# Patient Record
Sex: FEMALE | Race: WHITE | NOT HISPANIC OR LATINO | URBAN - METROPOLITAN AREA
[De-identification: names, ages, dates, MRNs, and addresses within clinical notes are randomized per-mention and may not be internally consistent; named-entity substitution may affect disease eponyms.]

---

## 2017-01-30 ENCOUNTER — COMMUNICATION - HEALTHEAST (OUTPATIENT)
Dept: FAMILY MEDICINE | Facility: CLINIC | Age: 32
End: 2017-01-30

## 2017-01-30 DIAGNOSIS — Z71.84 TRAVEL ADVICE ENCOUNTER: ICD-10-CM

## 2017-04-21 ENCOUNTER — COMMUNICATION - HEALTHEAST (OUTPATIENT)
Dept: TELEHEALTH | Facility: CLINIC | Age: 32
End: 2017-04-21

## 2017-04-21 ENCOUNTER — RECORDS - HEALTHEAST (OUTPATIENT)
Dept: GENERAL RADIOLOGY | Facility: CLINIC | Age: 32
End: 2017-04-21

## 2017-04-21 ENCOUNTER — OFFICE VISIT - HEALTHEAST (OUTPATIENT)
Dept: FAMILY MEDICINE | Facility: CLINIC | Age: 32
End: 2017-04-21

## 2017-04-21 DIAGNOSIS — K59.00 CONSTIPATION: ICD-10-CM

## 2017-04-21 DIAGNOSIS — Z71.84 TRAVEL ADVICE ENCOUNTER: ICD-10-CM

## 2017-04-21 DIAGNOSIS — K59.00 CONSTIPATION, UNSPECIFIED: ICD-10-CM

## 2017-04-21 ASSESSMENT — MIFFLIN-ST. JEOR: SCORE: 1231.59

## 2017-04-26 ENCOUNTER — RECORDS - HEALTHEAST (OUTPATIENT)
Dept: ADMINISTRATIVE | Facility: OTHER | Age: 32
End: 2017-04-26

## 2017-04-27 ENCOUNTER — RECORDS - HEALTHEAST (OUTPATIENT)
Dept: ADMINISTRATIVE | Facility: OTHER | Age: 32
End: 2017-04-27

## 2017-05-04 ENCOUNTER — HOSPITAL ENCOUNTER (OUTPATIENT)
Dept: ULTRASOUND IMAGING | Facility: HOSPITAL | Age: 32
Discharge: HOME | End: 2017-05-04
Attending: INTERNAL MEDICINE

## 2017-05-04 DIAGNOSIS — R79.89 ABNORMAL LFTS: ICD-10-CM

## 2017-05-04 DIAGNOSIS — R79.89 OTHER SPECIFIED ABNORMAL FINDINGS OF BLOOD CHEMISTRY: ICD-10-CM

## 2017-05-10 ENCOUNTER — RECORDS - HEALTHEAST (OUTPATIENT)
Dept: ADMINISTRATIVE | Facility: OTHER | Age: 32
End: 2017-05-10

## 2017-08-14 ENCOUNTER — COMMUNICATION - HEALTHEAST (OUTPATIENT)
Dept: FAMILY MEDICINE | Facility: CLINIC | Age: 32
End: 2017-08-14

## 2017-08-30 ENCOUNTER — COMMUNICATION - HEALTHEAST (OUTPATIENT)
Dept: FAMILY MEDICINE | Facility: CLINIC | Age: 32
End: 2017-08-30

## 2017-08-30 DIAGNOSIS — Z30.9 CONTRACEPTION MANAGEMENT: ICD-10-CM

## 2017-09-01 ENCOUNTER — COMMUNICATION - HEALTHEAST (OUTPATIENT)
Dept: FAMILY MEDICINE | Facility: CLINIC | Age: 32
End: 2017-09-01

## 2017-09-04 ENCOUNTER — COMMUNICATION - HEALTHEAST (OUTPATIENT)
Dept: SCHEDULING | Facility: CLINIC | Age: 32
End: 2017-09-04

## 2017-09-04 DIAGNOSIS — Z30.9 CONTRACEPTION MANAGEMENT: ICD-10-CM

## 2017-10-10 ENCOUNTER — OFFICE VISIT - HEALTHEAST (OUTPATIENT)
Dept: FAMILY MEDICINE | Facility: CLINIC | Age: 32
End: 2017-10-10

## 2017-10-10 DIAGNOSIS — Z00.00 ROUTINE GENERAL MEDICAL EXAMINATION AT A HEALTH CARE FACILITY: ICD-10-CM

## 2017-10-10 DIAGNOSIS — L70.0 ACNE VULGARIS: ICD-10-CM

## 2017-10-10 DIAGNOSIS — Z30.9 CONTRACEPTION MANAGEMENT: ICD-10-CM

## 2017-10-10 DIAGNOSIS — R79.89 ABNORMAL LFTS: ICD-10-CM

## 2017-10-10 DIAGNOSIS — H69.90 EUSTACHIAN TUBE DYSFUNCTION: ICD-10-CM

## 2017-10-10 RX ORDER — CLINDAMYCIN PHOSPHATE 10 MG/G
GEL TOPICAL 2 TIMES DAILY
Qty: 60 G | Refills: 3 | Status: SHIPPED | OUTPATIENT
Start: 2017-10-10

## 2017-10-10 RX ORDER — ADAPALENE GEL USP, 0.3% 3 MG/G
GEL TOPICAL
Qty: 45 G | Refills: 3 | Status: SHIPPED | OUTPATIENT
Start: 2017-10-10

## 2017-10-10 ASSESSMENT — MIFFLIN-ST. JEOR: SCORE: 1222.52

## 2018-07-16 ENCOUNTER — COMMUNICATION - HEALTHEAST (OUTPATIENT)
Dept: FAMILY MEDICINE | Facility: CLINIC | Age: 33
End: 2018-07-16

## 2018-07-16 DIAGNOSIS — Z30.9 CONTRACEPTION MANAGEMENT: ICD-10-CM

## 2018-07-18 RX ORDER — NORETHINDRONE ACETATE AND ETHINYL ESTRADIOL .02; 1 MG/1; MG/1
1 TABLET ORAL DAILY
Qty: 84 TABLET | Refills: 0 | Status: SHIPPED | OUTPATIENT
Start: 2018-07-18

## 2021-05-30 VITALS — HEIGHT: 63 IN | WEIGHT: 124 LBS | BODY MASS INDEX: 21.97 KG/M2

## 2021-05-31 VITALS — BODY MASS INDEX: 21.62 KG/M2 | HEIGHT: 63 IN | WEIGHT: 122 LBS

## 2021-06-10 NOTE — PROGRESS NOTES
Subjective    Roz Jean is a 31 y.o. female who presents for constipation.    The patient has had a few years of constipation.  Prior to a few years ago, she had normal, soft regular bowel movements. At first, bowel movements were intermittently hard and infrequent.  Over the last couple of years, frequency of constipation has increased and constipation is now constant.    Bowel movements occur once daily, in the morning, if she takes laxatives.  If she does not take laxatives, she never has regular bowel movements.  She gets very small amounts of stool that are hard to pass, for weeks.  She has been taking laxatives regularly.  Her most common laxative as senna, sometimes she uses rescue suppositories.  Consistency ranges.  She has small, hard pellets of stool she does not use a laxative.  If she uses a laxative, stools are tubular and soft to watery.  Occasionally, if her diet and routine are perfect, bowel movements can be regular.  But even small changes like a larger meal or change in food or activity will cause the constipation to recur.  And bowel movements are regular, she has no abdominal discomfort.  When she is constipated, she experiences bloating and cramping.  The color of her stools is medium brown.  She gets some bright red on the toilet paper when she has to push really hard to have a bowel movement.  She does notice that sometimes the stools are of narrow caliber.  Sometimes, they are of normal caliber.    The patient tries very hard to eat a healthy diet and she exercises regularly.  She maintains a near ideal body weight.  Diet is rich in greens, probiotics, protein powder, Kefir, kambucha tea, chicken and vegetables.  She drinks a lot of fluid throughout the course of the day and is known for carrying a water bottle with her.    She has seen a doctor for her symptoms twice before.  The first time, no changes were made.  The second time, they recommended taking magnesium.  She has been  "taking her RDA of magnesium every day, and it is not effective.    ROS:  Gen-some weight loss over last year, intentional (exercise and diet).  Endo-nails and hair dry,   -no problems  Skin - eczema a couple of years ago, needed prednisone. Perioral dermatitis.      FH: no bowel problems, mother has thyroid problems, grandparents MGM breast cancer, PGM breast cancer.    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     Objective    Blood pressure 110/68, pulse (!) 59, temperature 98.1  F (36.7  C), temperature source Oral, resp. rate 17, height 5' 3\" (1.6 m), weight 124 lb (56.2 kg), not currently breastfeeding. Body mass index is 21.97 kg/(m^2). Patient reports that she has never smoked. She does not have any smokeless tobacco history on file.    Gen: Alert, no apparent distress.  Heart: Regular rate and rhythm, no murmurs.  Lungs: Clear to auscultation bilaterally, no increased work of breathing.  Abdomen: Soft, non-tender, non-distended, bowel sounds normal.  Extremities: No clubbing, cyanosis, edema.    Results for orders placed or performed in visit on 04/21/17   HM1 (CBC with Diff)   Result Value Ref Range    WBC 6.5 4.0 - 11.0 thou/uL    RBC 4.77 3.80 - 5.40 mill/uL    Hemoglobin 13.8 12.0 - 16.0 g/dL    Hematocrit 41.8 35.0 - 47.0 %    MCV 88 80 - 100 fL    MCH 29.0 27.0 - 34.0 pg    MCHC 33.1 32.0 - 36.0 g/dL    RDW 11.5 11.0 - 14.5 %    Platelets 204 140 - 440 thou/uL    MPV 7.7 7.0 - 10.0 fL    Neutrophils % 69 50 - 70 %    Lymphocytes % 24 20 - 40 %    Monocytes % 5 2 - 10 %    Eosinophils % 1 0 - 6 %    Basophils % 1 0 - 2 %    Neutrophils Absolute 4.5 2.0 - 7.7 thou/uL    Lymphocytes Absolute 1.6 0.8 - 4.4 thou/uL    Monocytes Absolute 0.3 0.0 - 0.9 thou/uL    Eosinophils Absolute 0.1 0.0 - 0.4 thou/uL    Basophils Absolute 0.0 0.0 - 0.2 thou/uL     Xr Abdomen Flat And Upright    Result Date: 4/21/2017  XR ABDOMEN FLAT AND UPRIGHT 4/21/2017 2:07 PM INDICATION: Constipation for 3 years. " COMPARISON: None. FINDINGS: Considerable stool throughout the colon consistent with history. Otherwise negative abdomen. Bowel gas pattern is normal. Nothing for obstruction or free air. No evidence for renal stones. This report was electronically interpreted by: Dr. Paresh Decker MD ON 04/21/2017 at 14:21          Assessment & Plan      Roz is a 31 y.o. female who is here today for bowel issue (som with stomach cramps x 2 years)      1. Constipation for 3 years.  Differential diagnosis includes metabolic such as diabetes or thyroid problems, obstructive including malignancy, irritable bowel syndrome with constipation predominance.  Begin medical evaluation with labs and x-ray.  X-ray shows stool, no evidence for pathology or obstruction otherwise.  Referral made to [facility] for further evaluation and treatment.  Discussed with patient the plan to first get a good diagnosis, then proceed with treatment, as indicated.  Change in stool caliber is somewhat concerning for obstructive pathology, and further workup is indicated.    1. Constipation  XR Abdomen Flat and Upright    HM1(CBC and Differential)    Comprehensive Metabolic Panel    Thyroid Cascade    HM1 (CBC with Diff)    Ambulatory referral to Gastroenterology   2. Travel advice encounter  atovaquone-proguanil (MALARONE) 250-100 mg Tab           This transcription uses voice recognition software, which may contain typographical errors.

## 2021-06-13 NOTE — PROGRESS NOTES
SUBJECTIVE:   Roz Jean is a 31 y.o. female who presents for a routine physical exam.     Current concerns include the following:   Recheck LFTs.     Patient Active Problem List    Diagnosis Date Noted     Acne vulgaris 10/10/2017     Priority: Low     GERD (gastroesophageal reflux disease) 09/17/2016     Priority: Low     Hyperhidrosis 09/17/2016     Priority: Low     History reviewed. No pertinent past medical history.   Past Surgical History:   Procedure Laterality Date     DENTAL SURGERY      wisdom teeth + removal of supranumerary tooth      Current Outpatient Prescriptions   Medication Sig Dispense Refill     adapalene (DIFFERIN) 0.3 % gel Apply pea-sized amount topically at bedtime 45 g 3     clindamycin (CLINDAGEL) 1 % gel Apply topically 2 (two) times a day. 60 g 3     norethindrone-ethinyl estradiol (GILDESS 1/20, 21,) 1-20 mg-mcg per tablet Take 1 tablet by mouth daily. 84 tablet 3     fluticasone (FLONASE) 50 mcg/actuation nasal spray 1 spray into each nostril daily. 18 g 11     typhoid (VIVOTIF RAGHAVENDRA VACCINE) SR capsule Take 1 capsule by mouth every other day for 4 doses. on days 1, 3, 5, and 7. To be completed at least one week prior to potential exposure. 4 capsule 0     No current facility-administered medications for this visit.       Allergies   Allergen Reactions     Keflex [Cephalexin] Hives and Swelling     Neosporin (Ccm-Iuh-Sdezu) [Neomycin-Bacitracnzn-Polymyxnb] Hives and Swelling     Sulfa (Sulfonamide Antibiotics) Hives     Penicillins Nausea Only and Rash      Social History     Social History     Marital status: Single     Spouse name: N/A     Number of children: N/A     Years of education: N/A     Occupational History     Not on file.     Social History Main Topics     Smoking status: Never Smoker     Smokeless tobacco: Not on file     Alcohol use Not on file     Drug use: Not on file     Sexual activity: Not on file     Other Topics Concern     Not on file     Social History  "Narrative      Family History   Problem Relation Age of Onset     Hyperlipidemia Mother      Thyroid disease Mother      Hyperlipidemia Father      Hypertension Father      Skin cancer Father 42     not melanoma     Stroke Father 43     No Medical Problems Sister      Breast cancer Maternal Grandmother 70     Diabetes Maternal Grandmother      Other Maternal Grandfather      health history unknown     Breast cancer Paternal Grandmother 75     Heart disease Paternal Grandfather 60     Diabetes Paternal Grandfather      Stroke Paternal Grandfather 70     Heart attack Paternal Grandfather      Hypertension Paternal Grandfather      Hyperlipidemia Paternal Grandfather       REVIEW OF SYSTEMS: negative, except as listed in subjective above.    PHYSICAL EXAM:   BP 90/60  Pulse (!) 102  Temp 98.1  F (36.7  C) (Oral)   Resp 18  Ht 5' 3\" (1.6 m)  Wt 122 lb (55.3 kg)  BMI 21.61 kg/m2   History   Smoking Status     Never Smoker   Smokeless Tobacco     Not on file     GENERAL: Alert, NAD.  HEAD: NC/AT.  EARS: Normal canal, pinnae and tympanic membrane.  EYES: PERRL, normal fundi.  NOSE: Normal mucosa.  MOUTH: Adequate dentition, normal throat.  NECK: normal thyroid, midline trachea.  BREASTS: no masses, skin changes, nipple discharge or tenderness.  LUNGS: CTA bilaterally, no increased work of breathing.  HEART: RRR, no m/r/g  ABDOMEN: soft, nt/nd, BS+  : Not examined.  MSK: No significant deformity.  SKIN: no atypical lesion or rash.  LYMPHATICS: no lymphadenopathy.   PSYCH: normal affect.    No results found for this or any previous visit (from the past 24 hour(s)).  No results found.    ASSESSMENT/PLAN:   1. Cancer screening: up to date. Pap smear was being done annually at previous clinic. Results were normal.  2. Discussed Calcium, vitamin D, and weight bearing exercise.  3. Discussed maintenance of healthy body weight.   4. Contraception: refilled OCP  5. Acne vulgaris. Refilled patient's acne " medications.  6. History of abnormal LFTs - recheck LFTs.  7. 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 Has completed hepatitis A immunization. Rx given for typhoid.  8. Getting  next year. Planning pregnancy after wedding. Discussed folic acid. Fully immunized.    Roz was seen today for annual exam.    Diagnoses and all orders for this visit:    Routine general medical examination at a health care facility    Contraception management  -     norethindrone-ethinyl estradiol (GILDESS 1/20, 21,) 1-20 mg-mcg per tablet; Take 1 tablet by mouth daily.    Abnormal LFTs  -     Hepatic Profile    Acne vulgaris  -     clindamycin (CLINDAGEL) 1 % gel; Apply topically 2 (two) times a day.  -     adapalene (DIFFERIN) 0.3 % gel; Apply pea-sized amount topically at bedtime    Eustachian tube dysfunction  -     fluticasone (FLONASE) 50 mcg/actuation nasal spray; 1 spray into each nostril daily.    Other orders  -     Cancel: Gynecologic Cytology (PAP Smear)  -     typhoid (VIVOTIF RAGHAVENDRA VACCINE) SR capsule; Take 1 capsule by mouth every other day for 4 doses. on days 1, 3, 5, and 7. To be completed at least one week prior to potential exposure.

## 2021-06-16 PROBLEM — L70.0 ACNE VULGARIS: Status: ACTIVE | Noted: 2017-10-10
